# Patient Record
Sex: MALE | Race: WHITE | Employment: OTHER | URBAN - METROPOLITAN AREA
[De-identification: names, ages, dates, MRNs, and addresses within clinical notes are randomized per-mention and may not be internally consistent; named-entity substitution may affect disease eponyms.]

---

## 2018-12-21 ENCOUNTER — CLINICAL SUPPORT (OUTPATIENT)
Dept: CARDIOLOGY CLINIC | Age: 68
End: 2018-12-21

## 2018-12-21 VITALS
WEIGHT: 182 LBS | SYSTOLIC BLOOD PRESSURE: 87 MMHG | HEIGHT: 77 IN | HEART RATE: 114 BPM | BODY MASS INDEX: 21.49 KG/M2 | DIASTOLIC BLOOD PRESSURE: 66 MMHG

## 2018-12-21 DIAGNOSIS — Z95.5 S/P CORONARY ARTERY STENT PLACEMENT: ICD-10-CM

## 2018-12-21 DIAGNOSIS — E78.5 DYSLIPIDEMIA, GOAL LDL BELOW 70: ICD-10-CM

## 2018-12-21 DIAGNOSIS — I50.22 SYSTOLIC CHF, CHRONIC (HCC): ICD-10-CM

## 2018-12-21 DIAGNOSIS — I10 ESSENTIAL HYPERTENSION: ICD-10-CM

## 2018-12-21 DIAGNOSIS — Z95.810 BIVENTRICULAR IMPLANTABLE CARDIOVERTER-DEFIBRILLATOR IN SITU: ICD-10-CM

## 2018-12-21 DIAGNOSIS — I49.01 VENTRICULAR FIBRILLATION (HCC): Primary | ICD-10-CM

## 2018-12-21 DIAGNOSIS — I25.10 CORONARY ARTERY DISEASE INVOLVING NATIVE CORONARY ARTERY OF NATIVE HEART WITHOUT ANGINA PECTORIS: ICD-10-CM

## 2018-12-21 DIAGNOSIS — I42.8 NONISCHEMIC CARDIOMYOPATHY (HCC): ICD-10-CM

## 2018-12-21 RX ORDER — FAMOTIDINE 20 MG/1
20 TABLET, FILM COATED ORAL 2 TIMES DAILY
COMMUNITY

## 2018-12-21 RX ORDER — CARVEDILOL 12.5 MG/1
TABLET ORAL 2 TIMES DAILY WITH MEALS
COMMUNITY
End: 2018-12-21 | Stop reason: SDUPTHER

## 2018-12-21 RX ORDER — CARVEDILOL 6.25 MG/1
6.25 TABLET ORAL 2 TIMES DAILY WITH MEALS
Qty: 60 TAB | Refills: 6 | Status: SHIPPED | OUTPATIENT
Start: 2018-12-21

## 2018-12-21 RX ORDER — GABAPENTIN 300 MG/1
400 CAPSULE ORAL 2 TIMES DAILY
Qty: 60 CAP | Status: CANCELLED | OUTPATIENT
Start: 2018-12-21

## 2018-12-21 RX ORDER — ADHESIVE BANDAGE
30 BANDAGE TOPICAL DAILY PRN
COMMUNITY

## 2018-12-21 RX ORDER — SPIRONOLACTONE 25 MG/1
TABLET ORAL
Qty: 90 TAB | Refills: 1 | Status: SHIPPED | OUTPATIENT
Start: 2018-12-21 | End: 2019-04-22 | Stop reason: SDUPTHER

## 2018-12-21 RX ORDER — FLUTICASONE PROPIONATE 50 MCG
2 SPRAY, SUSPENSION (ML) NASAL DAILY
COMMUNITY

## 2018-12-21 RX ORDER — AMIODARONE HYDROCHLORIDE 200 MG/1
200 TABLET ORAL 2 TIMES DAILY
Qty: 60 TAB | Refills: 2 | Status: SHIPPED | OUTPATIENT
Start: 2018-12-21 | End: 2019-03-20 | Stop reason: SDUPTHER

## 2018-12-21 RX ORDER — LANOLIN ALCOHOL/MO/W.PET/CERES
CREAM (GRAM) TOPICAL
COMMUNITY

## 2018-12-21 RX ORDER — LANOLIN ALCOHOL/MO/W.PET/CERES
3 CREAM (GRAM) TOPICAL
Qty: 30 TAB | Status: CANCELLED | OUTPATIENT
Start: 2018-12-21

## 2018-12-21 RX ORDER — LISINOPRIL 5 MG/1
5 TABLET ORAL DAILY
Qty: 30 TAB | Refills: 3 | Status: SHIPPED | OUTPATIENT
Start: 2018-12-21 | End: 2019-04-16 | Stop reason: SDUPTHER

## 2018-12-21 RX ORDER — LISINOPRIL 20 MG/1
TABLET ORAL DAILY
COMMUNITY
End: 2018-12-21 | Stop reason: SDUPTHER

## 2018-12-21 NOTE — PROGRESS NOTES
HISTORY OF PRESENT ILLNESS  Leonardo Gray is a 79 y.o. male. Patient with h/o cardiomyopathy EF 15% s/p AICD and CAD s/p PCI to LAD recently seen at [facility] for orthostatic hypotension- repeat echo showed significant improvement in his LV function. Patient denies orthopnea or pnd. No pedal edema    12/2018  Extensive data reviewed. ICD shows he had multiple ICD shocks in July 2018 at that time he was admitted to the hospital and since discharge he has not followed up. He is out of all his medication. He had alarm on his ICD in view of that he presented to office. He had another ICD shock for sustained V. tach/ventricular fibrillation on December 13, 2018. He is short of breath. Denies orthopnea PND. No peripheral edema. Occasional dizziness. He had orthostatic hypotension in past      Shortness of Breath   The history is provided by the patient. This is a chronic problem. The problem occurs intermittently. The current episode started more than 1 week ago. The problem has not changed since onset. Pertinent negatives include no fever, no headaches, no ear pain, no neck pain, no cough, no sputum production, no hemoptysis, no wheezing, no PND, no orthopnea, no chest pain, no syncope, no vomiting, no abdominal pain, no rash, no leg swelling and no claudication. Associated medical issues include CAD, heart failure and past MI. Review of Systems   Constitutional: Negative for chills, diaphoresis, fever and weight loss. HENT: Negative for ear pain and hearing loss. Eyes: Negative for blurred vision. Respiratory: Positive for shortness of breath. Negative for cough, hemoptysis, sputum production, wheezing and stridor. Cardiovascular: Negative for chest pain, palpitations, orthopnea, claudication, leg swelling, syncope and PND. Gastrointestinal: Negative for abdominal pain, heartburn, nausea and vomiting. Musculoskeletal: Negative for myalgias and neck pain. Skin: Negative for rash.    Neurological: Negative for dizziness, tingling, tremors, focal weakness, loss of consciousness, weakness and headaches. Psychiatric/Behavioral: Negative for depression and suicidal ideas. Family History   Problem Relation Age of Onset    Hypertension Mother    [facility] Cancer Father        Past Medical History:   Diagnosis Date    CAD (coronary artery disease), native coronary artery     Cirrhosis ([facility] [address]     Coronary stent 3-2008    [place] 3.5 mm stent to mid LAD    Echocardiogram 01/13/2010    Tech difficult. EF 20-25%. LVE.  LAE.  RV - pacemaker. [employer] hypk. Apical dysk.  Gout attack     ICD (implantable cardiac defibrillator), biventricular, in situ 2008    [employer]    Nonischemic cardiomyopathy ([facility] [address] 2010    EF 20-25%    S/P cardiac cath 03/10/2008    mLAD 65% (3.5 x 15mm [place] BOB). Otherwise patent coronaries. LVEDP 22.  EF 10-15%. Past Surgical History:   Procedure Laterality Date    CARDIAC SURG PROCEDURE UNLIST  3/10/08    cardiac catheterization    HX ORTHOPAEDIC      back surgery C4 and C5/ neck surgery    HX TONSILLECTOMY         Social History     Tobacco Use    Smoking status: Current Every Day Smoker     Packs/day: 0.50     Years: 40.00     Pack years: 20.00     Types: Cigarettes    Smokeless tobacco: Never Used   Substance Use Topics    Alcohol use: Yes       Allergies   Allergen Reactions    Tylenol [Acetaminophen] Unknown (comments)     Liver cirrhosis, hepatitis            Visit Vitals  BP (!) 87/66   Pulse (!) 114   Ht 6' 5\" (1.956 m)   Wt 82.6 kg (182 lb)   BMI 21.58 kg/m²     Physical Exam   Constitutional: He is oriented to person, place, and time. He appears well-developed and well-nourished. No distress. HENT:   Head: Normocephalic and atraumatic. Mouth/Throat: No oropharyngeal exudate. Eyes: Conjunctivae and EOM are normal. No scleral icterus. Neck: Normal range of motion. Neck supple. No JVD present. Cardiovascular: Normal rate and regular rhythm.  Exam reveals no gallop. No murmur heard. Pulmonary/Chest: Effort normal and breath sounds normal. No stridor. He has no wheezes. He has no rales. Abdominal: Soft. There is no tenderness. There is no rebound. Musculoskeletal: Normal range of motion. He exhibits no edema or tenderness. Neurological: He is alert and oriented to person, place, and time. He exhibits normal muscle tone. Skin: He is not diaphoretic. Psychiatric: He has a normal mood and affect. His behavior is normal.       ASSESSMENT and PLAN    ICD-10-CM ICD-9-CM    1. Ventricular fibrillation (HCC) D80.06 751.22 METABOLIC PANEL, BASIC      HEPATIC FUNCTION PANEL      LIPID PANEL      T4, FREE      TSH 3RD GENERATION      ECHO ADULT COMPLETE    Episode with multiple ICD shock in July 2018. Subsequently has no follow-up. Had another episode of sustained V. tach/ventricular fibrillation and ICD shock    2. Coronary artery disease involving native coronary artery of native heart without angina pectoris N55.41 067.46 METABOLIC PANEL, BASIC      HEPATIC FUNCTION PANEL      LIPID PANEL      T4, FREE      TSH 3RD GENERATION    Stable continue treatment   3. Dyslipidemia, goal LDL below 70 E78.5 272.4     On statin lab with PCP   4. Nonischemic cardiomyopathy (HCC) L07.8 395.5 METABOLIC PANEL, BASIC      HEPATIC FUNCTION PANEL      LIPID PANEL      T4, FREE      TSH 3RD GENERATION    Follow-up echo. 5. Essential hypertension I10 401.9     Stable   6. S/P coronary artery stent placement Z95.5 V45.82     Stable   7. Biventricular implantable cardioverter-defibrillator in situ Z95.810 V45.02     Office check today   8. Systolic CHF, chronic (HCC) B96.12 564.54 METABOLIC PANEL, BASIC     428.0 HEPATIC FUNCTION PANEL      LIPID PANEL      T4, FREE      TSH 3RD GENERATION    class3       Follow-up Disposition:  Return in about 4 weeks (around 1/18/2019) for follow up with Dr Carmen Carcamo.   current treatment plan is effective, no change in therapy  use of aspirin to prevent MI and TIA's discussed. Patient with prior CMO - now EF has improved to 50% and is in compenstated state. Currently on coreg 3.125mg bid and lasix 20mg daily. ACE-I and aldactone discontinued as his LV function has improved and had severe orthostatic hypotension. No chest pain or sob. Will need lipid and liver profile. Physical Exam   Constitutional: He is oriented to person, place, and time. He appears well-developed and well-nourished. No distress. HENT:   Head: Normocephalic and atraumatic. Mouth/Throat: No oropharyngeal exudate. Eyes: Conjunctivae and EOM are normal. No scleral icterus. Neck: Normal range of motion. Neck supple. No JVD present. Cardiovascular: Normal rate and regular rhythm. Exam reveals no gallop. No murmur heard. Pulmonary/Chest: Effort normal and breath sounds normal. No stridor. He has no wheezes. He has no rales. Abdominal: Soft. There is no tenderness. There is no rebound. Musculoskeletal: Normal range of motion. He exhibits no edema or tenderness. Neurological: He is alert and oriented to person, place, and time. He exhibits normal muscle tone. Skin: He is not diaphoretic. Psychiatric: He has a normal mood and affect. His behavior is normal.   12/2018  Patient presented today for a alarm on his ICD. Interrogation shows that he had a sustained V. tach/V. fib on December 13, 2018 with ICD discharge. Patient unaware of this. He was admitted in July 2018 with  with multiple ICD shock. He has not followed up for some time with test.  He is out of all his medication. Operative wall is severely elevated. He is tachycardic as he has not taken any medication  Increase fluid overload with congestive failure. Blood pressure low normal restarted medication at lower dose. Patient regularly monitor his blood pressure at home. Comply with salt and fluid restriction.   Check baseline lab including LFT as he had elevated LFT in past.  Based on lab and LFT consider restarting statin  Stressed compliance. Medications refilled. Check echo. Add amiodarone.   Follow-up with Dr. Nellie Martinez in 4 weeks

## 2018-12-21 NOTE — PATIENT INSTRUCTIONS
[employer] Activation    Thank you for requesting access to in[employer]DA. Please follow the instructions below to securely access and download your online medical record. inFreeDA allows you to send messages to your doctor, view your test results, renew your prescriptions, schedule appointments, and more. How Do I Sign Up? 1. In your internet browser, go to [email or web address]. 2. Click on the First Time User? Click Here link in the Sign In box. You will see the New Member Sign Up page. 3. Enter your inFreeDA Access Code exactly as it appears below. You will not need to use this code after youve completed the sign-up process. If you do not sign up before the expiration date, you must request a new code. inFreeDA Access Code: Activation code not generated  Current inFreeDA Status: Active (This is the date your inFreeDA access code will )    4. Enter the last four digits of your Social Security Number (xxxx) and Date of Birth (mm/dd/yyyy) as indicated and click Submit. You will be taken to the next sign-up page. 5. Create a inFreeDA ID. This will be your inFreeDA login ID and cannot be changed, so think of one that is secure and easy to remember. 6. Create a inFreeDA password. You can change your password at any time. 7. Enter your Password Reset Question and Answer. This can be used at a later time if you forget your password. 8. Enter your e-mail address. You will receive e-mail notification when new information is available in [address] 9. Click Sign Up. You can now view and download portions of your medical record. 10. Click the Download Summary menu link to download a portable copy of your medical information. Additional Information    If you have questions, please visit the Frequently Asked Questions section of the inFreeDA website at [email or web address] Remember, in[employer]DA is NOT to be used for urgent needs. For medical emergencies, dial 911.

## 2018-12-21 NOTE — LETTER
Jc Henderson 1950 12/21/2018 Dear Fe Munoz MD 
 
I had the pleasure of evaluating  Mr. Lovelace in office today. Below are the relevant portions of my assessment and plan of care. ICD-10-CM ICD-9-CM 1. Ventricular fibrillation (HCC) I36.95 140.54 METABOLIC PANEL, BASIC  
   HEPATIC FUNCTION PANEL  
   LIPID PANEL  
   T4, FREE  
   TSH 3RD GENERATION  
   ECHO ADULT COMPLETE Episode with multiple ICD shock in July 2018. Subsequently has no follow-up. Had another episode of sustained V. tach/ventricular fibrillation and ICD shock 2. Coronary artery disease involving native coronary artery of native heart without angina pectoris O00.42 216.03 METABOLIC PANEL, BASIC  
   HEPATIC FUNCTION PANEL  
   LIPID PANEL  
   T4, FREE  
   TSH 3RD GENERATION Stable continue treatment 3. Dyslipidemia, goal LDL below 70 E78.5 272.4 On statin lab with PCP 4. Nonischemic cardiomyopathy (HCC) M83.5 686.9 METABOLIC PANEL, BASIC  
   HEPATIC FUNCTION PANEL  
   LIPID PANEL  
   T4, FREE  
   TSH 3RD GENERATION Follow-up echo. 5. Essential hypertension I10 401.9 Stable 6. S/P coronary artery stent placement Z95.5 V45.82 Stable 7. Biventricular implantable cardioverter-defibrillator in situ Z95.810 V45.02 Office check today 8. Systolic CHF, chronic (HCC) M13.56 963.46 METABOLIC PANEL, BASIC  
  428.0 HEPATIC FUNCTION PANEL  
   LIPID PANEL  
   T4, FREE  
   TSH 3RD GENERATION  
 class3 Current Outpatient Medications Medication Sig Dispense Refill  famotidine (PEPCID) 20 mg tablet Take 20 mg by mouth two (2) times a day.  fluticasone (FLONASE ALLERGY RELIEF) 50 mcg/actuation nasal spray 2 Sprays by Both Nostrils route daily.  zolpidem tartrate (AMBIEN PO) Take  by mouth.  magnesium hydroxide (MILK OF MAGNESIA) 400 mg/5 mL suspension Take 30 mL by mouth daily as needed for Constipation.  melatonin 3 mg tablet Take  by mouth.  carvedilol (COREG) 6.25 mg tablet Take 1 Tab by mouth two (2) times daily (with meals). 60 Tab 6  
 lisinopril (PRINIVIL, ZESTRIL) 5 mg tablet Take 1 Tab by mouth daily. 30 Tab 3  
 spironolactone (ALDACTONE) 25 mg tablet TAKE ONE TABLET BY MOUTH EVERY DAY 90 Tab 1  
 amiodarone (CORDARONE) 200 mg tablet Take 1 Tab by mouth two (2) times a day. 60 Tab 2  
 triamcinolone acetonide (KENALOG) 0.1 % ointment Apply  to affected area two (2) times a day. use thin layer  alum-mag hydroxide-simeth (MYLANTA) 200-200-20 mg/5 mL susp Take 30 mL by mouth every four (4) hours as needed.  gabapentin (NEURONTIN) 300 mg capsule Take  by mouth two (2) times a day.  cyanocobalamin 1,000 mcg tablet Take 1,000 mcg by mouth daily.  furosemide (LASIX) 20 mg tablet TAKE ONE TABLET BY MOUTH EVERY DAY (Patient taking differently: TAKE ONE TABLET BY MOUTH EVERY DAY AS NEEDED) 90 Tab 2  
 venlafaxine-SR (EFFEXOR XR) 150 mg capsule Take  by mouth daily.  colchicine 0.6 mg tablet Take 0.6 mg by mouth as needed.  aspirin 81 mg tablet Take  by mouth.  ramipril (ALTACE) 2.5 mg capsule TAKE ONE CAPSULE BY MOUTH EVERY DAY 90 capsule 3 Orders Placed This Encounter  METABOLIC PANEL, BASIC Standing Status:   Future Standing Expiration Date:   1/20/2019  
 HEPATIC FUNCTION PANEL Standing Status:   Future Standing Expiration Date:   6/21/2019  LIPID PANEL Standing Status:   Future Standing Expiration Date:   6/21/2019  T4, FREE Standing Status:   Future Standing Expiration Date:   12/22/2019  
 TSH 3RD GENERATION Standing Status:   Future Standing Expiration Date:   1/20/2019  DISCONTD: carvedilol (COREG) 12.5 mg tablet Sig: Take  by mouth two (2) times daily (with meals).  famotidine (PEPCID) 20 mg tablet Sig: Take 20 mg by mouth two (2) times a day.  fluticasone (FLONASE ALLERGY RELIEF) 50 mcg/actuation nasal spray Si Sprays by Both Nostrils route daily.  DISCONTD: lisinopril (PRINIVIL, ZESTRIL) 20 mg tablet Sig: Take  by mouth daily.  zolpidem tartrate (AMBIEN PO) Sig: Take  by mouth.  magnesium hydroxide (MILK OF MAGNESIA) 400 mg/5 mL suspension Sig: Take 30 mL by mouth daily as needed for Constipation.  melatonin 3 mg tablet Sig: Take  by mouth.  carvedilol (COREG) 6.25 mg tablet Sig: Take 1 Tab by mouth two (2) times daily (with meals). Dispense:  60 Tab Refill:  6  
 lisinopril (PRINIVIL, ZESTRIL) 5 mg tablet Sig: Take 1 Tab by mouth daily. Dispense:  30 Tab Refill:  3  
 spironolactone (ALDACTONE) 25 mg tablet Sig: TAKE ONE TABLET BY MOUTH EVERY DAY Dispense:  90 Tab Refill:  1  
 amiodarone (CORDARONE) 200 mg tablet Sig: Take 1 Tab by mouth two (2) times a day. Dispense:  60 Tab Refill:  2 If you have questions, please do not hesitate to call me. I look forward to following Mr. Lovelace along with you. Sincerely, Ambreen Galan MD

## 2019-03-20 RX ORDER — AMIODARONE HYDROCHLORIDE 200 MG/1
200 TABLET ORAL 2 TIMES DAILY
Qty: 60 TAB | Refills: 2 | Status: SHIPPED | OUTPATIENT
Start: 2019-03-20 | End: 2019-06-15 | Stop reason: SDUPTHER

## 2019-04-16 RX ORDER — LISINOPRIL 5 MG/1
5 TABLET ORAL DAILY
Qty: 30 TAB | Refills: 6 | Status: SHIPPED | OUTPATIENT
Start: 2019-04-16

## 2019-04-22 RX ORDER — SPIRONOLACTONE 25 MG/1
TABLET ORAL
Qty: 90 TAB | Refills: 3 | Status: SHIPPED | OUTPATIENT
Start: 2019-04-22

## 2019-06-17 RX ORDER — AMIODARONE HYDROCHLORIDE 200 MG/1
TABLET ORAL
Qty: 60 TAB | Refills: 0 | Status: SHIPPED | OUTPATIENT
Start: 2019-06-17